# Patient Record
Sex: FEMALE | Race: WHITE
[De-identification: names, ages, dates, MRNs, and addresses within clinical notes are randomized per-mention and may not be internally consistent; named-entity substitution may affect disease eponyms.]

---

## 2018-08-08 ENCOUNTER — HOSPITAL ENCOUNTER (OUTPATIENT)
Dept: HOSPITAL 92 - L&D/OP | Age: 33
Discharge: HOME | End: 2018-08-08
Attending: FAMILY MEDICINE
Payer: COMMERCIAL

## 2018-08-08 VITALS — BODY MASS INDEX: 38.7 KG/M2

## 2018-08-08 VITALS — DIASTOLIC BLOOD PRESSURE: 67 MMHG | SYSTOLIC BLOOD PRESSURE: 130 MMHG | TEMPERATURE: 98.4 F

## 2018-08-08 DIAGNOSIS — R19.7: ICD-10-CM

## 2018-08-08 DIAGNOSIS — R10.2: ICD-10-CM

## 2018-08-08 DIAGNOSIS — O99.89: Primary | ICD-10-CM

## 2018-08-08 DIAGNOSIS — Z3A.29: ICD-10-CM

## 2018-08-08 LAB
FFN INTERNAL QC ANALYZER: (no result)
FFN INTERNAL QC CASSETTE: (no result)

## 2018-08-08 PROCEDURE — 87480 CANDIDA DNA DIR PROBE: CPT

## 2018-08-08 PROCEDURE — 82731 ASSAY OF FETAL FIBRONECTIN: CPT

## 2018-08-08 PROCEDURE — 99284 EMERGENCY DEPT VISIT MOD MDM: CPT

## 2018-08-08 PROCEDURE — 87510 GARDNER VAG DNA DIR PROBE: CPT

## 2018-08-08 PROCEDURE — 87660 TRICHOMONAS VAGIN DIR PROBE: CPT

## 2018-08-08 NOTE — PDOC.LDHP
Labor and Delivery H&P


Chief complaint: other (vaginal pressure, diarrhea, pelvic pain, mucus plug lost

)


OB History Details: 





3 term SVDs, uncomplicated


Current pregnancy complications: none


Past Medical History: 





Migraines, PCOS, anxiety, frequent UTIs


Previous surgical history: none


Allergies/Adverse Reactions: 


 Allergies











Allergy/AdvReac Type Severity Reaction Status Date / Time


 


Sulfa (Sulfonamide Allergy   Verified 18 18:24





Antibiotics)     


 


tramadol [From Ultram] Allergy   Verified 18 18:24











Social history: none





- Physical Exam


Vital signs reviewed and normal: yes


General: NAD, resting


Lungs: nonlabored breathing


Abdomen: gravid


Extremeties: no edema


FHT: category 1 (140s, mod variability, + accels, no decels)


[place] contractions every: none





- Vaginal Exam


cm dilated: 0


Effacement: 0%


Station: -3





- Assessment





32 y/o  at 29w0d with no e/o PTL.  FFN negative.  Fetal status 

reassuring with reactive NST.  VP3 pending.





- Plan


-: 





D/c home with precautions.  Advised to stay well hydrated and keep all prenatal 

appointments.  Will call patient if VP3 results require treatment.

## 2018-11-04 ENCOUNTER — HOSPITAL ENCOUNTER (INPATIENT)
Dept: HOSPITAL 92 - L&D | Age: 33
LOS: 2 days | Discharge: HOME | End: 2018-11-06
Attending: FAMILY MEDICINE | Admitting: FAMILY MEDICINE
Payer: COMMERCIAL

## 2018-11-04 VITALS — BODY MASS INDEX: 39.4 KG/M2

## 2018-11-04 DIAGNOSIS — O48.0: Primary | ICD-10-CM

## 2018-11-04 DIAGNOSIS — Z3A.41: ICD-10-CM

## 2018-11-04 LAB
HBSAG INDEX: 0.24 S/CO (ref 0–0.99)
HGB BLD-MCNC: 11 G/DL (ref 12–16)
MCH RBC QN AUTO: 30.2 PG (ref 27–31)
MCV RBC AUTO: 89.6 FL (ref 78–98)
PLATELET # BLD AUTO: 245 THOU/UL (ref 130–400)
RBC # BLD AUTO: 3.65 MILL/UL (ref 4.2–5.4)
SYPHILIS ANTIBODY INDEX: 0.03 S/CO
WBC # BLD AUTO: 11.2 THOU/UL (ref 4.8–10.8)

## 2018-11-04 PROCEDURE — 86901 BLOOD TYPING SEROLOGIC RH(D): CPT

## 2018-11-04 PROCEDURE — 87340 HEPATITIS B SURFACE AG IA: CPT

## 2018-11-04 PROCEDURE — 3E033VJ INTRODUCTION OF OTHER HORMONE INTO PERIPHERAL VEIN, PERCUTANEOUS APPROACH: ICD-10-PCS | Performed by: FAMILY MEDICINE

## 2018-11-04 PROCEDURE — 86850 RBC ANTIBODY SCREEN: CPT

## 2018-11-04 PROCEDURE — 85027 COMPLETE CBC AUTOMATED: CPT

## 2018-11-04 PROCEDURE — 86900 BLOOD TYPING SEROLOGIC ABO: CPT

## 2018-11-04 PROCEDURE — 86780 TREPONEMA PALLIDUM: CPT

## 2018-11-05 RX ADMIN — HYDROCODONE BITARTRATE AND ACETAMINOPHEN PRN TAB: 5; 325 TABLET ORAL at 10:17

## 2018-11-05 RX ADMIN — DOCUSATE CALCIUM SCH MG: 240 CAPSULE, LIQUID FILLED ORAL at 09:08

## 2018-11-05 RX ADMIN — DOCUSATE CALCIUM SCH MG: 240 CAPSULE, LIQUID FILLED ORAL at 20:52

## 2018-11-05 RX ADMIN — HYDROCODONE BITARTRATE AND ACETAMINOPHEN PRN TAB: 5; 325 TABLET ORAL at 20:26

## 2018-11-05 RX ADMIN — HYDROCODONE BITARTRATE AND ACETAMINOPHEN PRN TAB: 5; 325 TABLET ORAL at 06:18

## 2018-11-05 NOTE — OP
PREOPERATIVE DIAGNOSIS:  Term intrauterine pregnancy.

 

POSTOPERATIVE DIAGNOSIS:  Term intrauterine pregnancy.

 

PROCEDURE PERFORMED:  Normal spontaneous vaginal delivery.

 

SURGEON:  Yarelis Ruiz M.D.

 

ANESTHESIA:  Epidural.

 

ESTIMATED BLOOD LOSS:  Pending at the time of this dictation.

 

BRIEF DELIVERY SUMMARY:  This is a 33-year-old G4, now P4, who presented for induction of labor post-
dates at 41 and 4/7th weeks.  She had a Cook's balloon placed initially with good response.  She was 
hoping to avoid the use of Pitocin, so we attempted nipple stimulation with a few contractions, but n
o cervical change.  She did eventually agree to Pitocin and that was started later in the afternoon. 
 She made little to no cervical change from about 2:00 p.m. until about 9:00 p.m.  After amniotomy, t
he intensity of her contractions picked up and she progressed to complete and pushing.  She delivered
 a live male infant, head OA.  Mouth and nares were bulb suctioned at the perineum.  There was no nuc
hortencia cord.  Shoulders and body easily followed and the infant was placed on mother's abdomen.  Infant 
Apgars were 8 at 1 minute and 9 at 5 minutes.  The umbilical cord was doubly clamped and cut and the 
infant was placed skin to skin with mom.  Cord blood was collected and sent for analysis.  Placenta d
elivered spontaneously and intact with a 3-vessel umbilical cord.  Uterine fundus was firm following 
evacuation of the placenta.  There were no lacerations.  Mom and baby were left with the nurse in exc
ellent condition, attempting to breast feed.

## 2018-11-06 VITALS — TEMPERATURE: 98.3 F | SYSTOLIC BLOOD PRESSURE: 121 MMHG | DIASTOLIC BLOOD PRESSURE: 64 MMHG

## 2018-11-06 RX ADMIN — DOCUSATE CALCIUM SCH MG: 240 CAPSULE, LIQUID FILLED ORAL at 09:00

## 2018-11-06 RX ADMIN — HYDROCODONE BITARTRATE AND ACETAMINOPHEN PRN TAB: 5; 325 TABLET ORAL at 12:28

## 2018-11-06 RX ADMIN — HYDROCODONE BITARTRATE AND ACETAMINOPHEN PRN TAB: 5; 325 TABLET ORAL at 00:33

## 2018-11-06 RX ADMIN — HYDROCODONE BITARTRATE AND ACETAMINOPHEN PRN TAB: 5; 325 TABLET ORAL at 04:19

## 2018-11-06 RX ADMIN — HYDROCODONE BITARTRATE AND ACETAMINOPHEN PRN TAB: 5; 325 TABLET ORAL at 09:00

## 2018-11-06 NOTE — PDOC.PP
Post Partum Progress Note


Post Partum Day #: 1


Subjective: 





No c/o. Doing well. Breastfeeding going well.


PO intake tolerated: yes


Flatus: yes


Ambulation: yes


 Vital Signs (12 hours)











  Temp Pulse Resp BP Pulse Ox


 


 11/06/18 11:31  98.3 F  89  20  121/64 


 


 11/06/18 07:53  98.2 F  62  20  114/66  98


 


 11/06/18 07:50  98.2 F  62  20  114/66  98


 


 11/06/18 04:40  98.0 F  78  18  96/55 L 








 Weight











Weight                         216 lb

















- Physical Examination


General: NAD


Cardiovascular: no m/r/g, RRR


Respiratory: clear to auscultation bilaterally, non-labored breathing


Abdominal: + bowel sounds, lochia, no distention, appropriately TTP


Result Diagrams: 


 11/04/18 09:15





Additional Labs: 


 Post Partum Labs











Blood Type  B POSITIVE   11/04/18  09:15    


 


Hep Bs Antigen  Non-Reactive S/CO (NonReactive)   11/04/18  09:15    











(1) Vaginal delivery


Code(s): O80 - ENCOUNTER FOR FULL-TERM UNCOMPLICATED DELIVERY   Status: Acute   





- Assessment/Plan





Routine PP care.


Doing well.